# Patient Record
Sex: FEMALE | Race: BLACK OR AFRICAN AMERICAN | Employment: FULL TIME | URBAN - METROPOLITAN AREA
[De-identification: names, ages, dates, MRNs, and addresses within clinical notes are randomized per-mention and may not be internally consistent; named-entity substitution may affect disease eponyms.]

---

## 2020-04-29 LAB
SARS-COV-2: NOT DETECTED
SOURCE: NORMAL

## 2020-08-09 ENCOUNTER — HOSPITAL ENCOUNTER (EMERGENCY)
Age: 35
Discharge: HOME | End: 2020-08-09
Attending: STUDENT IN AN ORGANIZED HEALTH CARE EDUCATION/TRAINING PROGRAM
Payer: COMMERCIAL

## 2020-08-09 VITALS
SYSTOLIC BLOOD PRESSURE: 133 MMHG | BODY MASS INDEX: 24.01 KG/M2 | WEIGHT: 153 LBS | DIASTOLIC BLOOD PRESSURE: 80 MMHG | RESPIRATION RATE: 18 BRPM | TEMPERATURE: 98.2 F | HEART RATE: 80 BPM | OXYGEN SATURATION: 100 % | HEIGHT: 67 IN

## 2020-08-09 PROCEDURE — 99282 EMERGENCY DEPT VISIT SF MDM: CPT

## 2020-08-09 RX ORDER — NAPROXEN 500 MG/1
500 TABLET ORAL 2 TIMES DAILY WITH MEALS
Qty: 30 TABLET | Refills: 1 | Status: SHIPPED | OUTPATIENT
Start: 2020-08-09

## 2020-08-09 RX ORDER — ACETAMINOPHEN 325 MG/1
325 TABLET ORAL EVERY 6 HOURS PRN
Qty: 30 TABLET | Refills: 0 | Status: SHIPPED | OUTPATIENT
Start: 2020-08-09

## 2020-08-09 ASSESSMENT — PAIN DESCRIPTION - ORIENTATION: ORIENTATION: LEFT

## 2020-08-09 ASSESSMENT — PAIN DESCRIPTION - DESCRIPTORS: DESCRIPTORS: DULL

## 2020-08-09 ASSESSMENT — ENCOUNTER SYMPTOMS
COUGH: 0
SHORTNESS OF BREATH: 0
BACK PAIN: 0
RESPIRATORY NEGATIVE: 1

## 2020-08-09 ASSESSMENT — PAIN SCALES - GENERAL: PAINLEVEL_OUTOF10: 7

## 2020-08-09 ASSESSMENT — PAIN DESCRIPTION - LOCATION: LOCATION: ARM

## 2020-08-09 NOTE — ED TRIAGE NOTES
Pt to ED for left arm pain that has been on going for about a year but has worsened recently. Pt describes it as dull and states that it feels like her arm is asleep. Pt denies chest pain and shortness of breath. Pt has equal . 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

## 2020-08-09 NOTE — ED PROVIDER NOTES
Constitutional:       General: She is not in acute distress. Appearance: She is not ill-appearing. HENT:      Head: Normocephalic and atraumatic. Pulmonary:      Effort: Pulmonary effort is normal.   Musculoskeletal: Normal range of motion. Comments: No cervical midline tenderness. Patient reports pain from her left humerus to her left forearm. No erythema, edema, warmth of left arm. No focal tenderness. Full range of motion of the left upper extremity. Equal strength bilateral upper extremities. Motor and sensory intact. 2/4 radial pulses. Skin:     General: Skin is warm and dry. Neurological:      General: No focal deficit present. Mental Status: She is alert and oriented to person, place, and time. Mental status is at baseline. Psychiatric:         Mood and Affect: Mood normal.         Behavior: Behavior normal.         Thought Content: Thought content normal.         Judgment: Judgment normal.         Diagnostic Results         RADIOLOGY:  No orders to display       LABS:   No results found for this visit on 08/09/20. ED BEDSIDE ULTRASOUND:      RECENT VITALS:  BP: 133/80, Temp: 98.2 °F (36.8 °C), Pulse: 80, Resp: 18, SpO2: 100 %     Procedures         ED Course     Nursing Notes, Past Medical Hx,Past Surgical Hx, Social Hx, Allergies, and Family Hx were reviewed. The patient was given the following medications:  Orders Placed This Encounter   Medications    naproxen (NAPROSYN) 500 MG tablet     Sig: Take 1 tablet by mouth 2 times daily (with meals)     Dispense:  30 tablet     Refill:  1    acetaminophen (TYLENOL) 325 MG tablet     Sig: Take 1 tablet by mouth every 6 hours as needed for Pain     Dispense:  30 tablet     Refill:  0       CONSULTS:  None    MEDICAL DECISION MAKING / ASSESSMENT / PLAN     Duy Boyer is a 28 y.o. female pain. Patient is well-appearing and in no acute distress. Vitals are normal.  No midline spine tenderness.   No signs of cellulitis or septic arthritis to left upper extremity. No signs of blood clot. Patient is neurovascularly intact. I suspect the pain is likely due to radiculopathy. No indication for emergent MRI. Patient will be managed symptomatically. Encourage follow-up with spine. Return precautions discussed. This patient was also evaluated by the attending physician. All care plans were discussed and agreed upon. Clinical Impression     1.  Radiculopathy of cervicothoracic region        Disposition     PATIENT REFERRED TO:  [facility] Emergency Department  [address]  [address]  [phone]    As needed, If symptoms worsen      DISCHARGE MEDICATIONS:  Discharge Medication List as of 8/9/2020  6:40 PM      START taking these medications    Details   naproxen (NAPROSYN) 500 MG tablet Take 1 tablet by mouth 2 times daily (with meals), Disp-30 tablet,R-1Print      acetaminophen (TYLENOL) 325 MG tablet Take 1 tablet by mouth every 6 hours as needed for Pain, Disp-30 tablet,R-0Print             DISPOSITION Decision To Discharge 08/09/2020 06:20:38 PM        Helga Kraus PA-C  08/09/20 0827